# Patient Record
Sex: FEMALE | URBAN - METROPOLITAN AREA
[De-identification: names, ages, dates, MRNs, and addresses within clinical notes are randomized per-mention and may not be internally consistent; named-entity substitution may affect disease eponyms.]

---

## 2024-01-23 ENCOUNTER — OFFICE VISIT (OUTPATIENT)
Dept: URBAN - METROPOLITAN AREA CLINIC 27 | Facility: CLINIC | Age: 49
End: 2024-01-23

## 2024-01-23 NOTE — HPI-TODAY'S VISIT:
Ms. Morin is a 48 YOF new patient referred by Dr. Pearce for screening colonoscopy with ?Dr. Sanchez.